# Patient Record
Sex: MALE | Race: BLACK OR AFRICAN AMERICAN | Employment: OTHER | URBAN - METROPOLITAN AREA
[De-identification: names, ages, dates, MRNs, and addresses within clinical notes are randomized per-mention and may not be internally consistent; named-entity substitution may affect disease eponyms.]

---

## 2023-12-04 ENCOUNTER — HOSPITAL ENCOUNTER (OUTPATIENT)
Age: 73
Discharge: ANOTHER HOSPITAL | End: 2023-12-04
Payer: MEDICARE

## 2023-12-04 VITALS
SYSTOLIC BLOOD PRESSURE: 125 MMHG | DIASTOLIC BLOOD PRESSURE: 75 MMHG | OXYGEN SATURATION: 100 % | RESPIRATION RATE: 20 BRPM | TEMPERATURE: 99 F | HEART RATE: 89 BPM

## 2023-12-04 DIAGNOSIS — R60.9 PITTING EDEMA: ICD-10-CM

## 2023-12-04 DIAGNOSIS — M79.662 PAIN OF LEFT CALF: ICD-10-CM

## 2023-12-04 DIAGNOSIS — M79.89 CALF SWELLING: Primary | ICD-10-CM

## 2023-12-04 PROCEDURE — 99203 OFFICE O/P NEW LOW 30 MIN: CPT | Performed by: EMERGENCY MEDICINE

## 2023-12-04 NOTE — ED INITIAL ASSESSMENT (HPI)
Pt here with chronic BLE pain. Pt noticed LLE swelling that was noticed yesterday. Denies chest pain or SOB.

## 2023-12-04 NOTE — DISCHARGE INSTRUCTIONS
We are sending you to the closest ER due to concern for deep vein thrombosis in your leg. It is not normal to have calf pain, calf swelling, or the symptoms you are experiencing today with sudden on et one day ago. Unfortunately today we limitations of the urgent care, and unable to do Ultrasound.